# Patient Record
Sex: FEMALE | Race: AMERICAN INDIAN OR ALASKA NATIVE
[De-identification: names, ages, dates, MRNs, and addresses within clinical notes are randomized per-mention and may not be internally consistent; named-entity substitution may affect disease eponyms.]

---

## 2018-09-27 ENCOUNTER — HOSPITAL ENCOUNTER (OUTPATIENT)
Dept: HOSPITAL 5 - SPVIMAG | Age: 73
Discharge: HOME | End: 2018-09-27
Attending: SURGERY
Payer: MEDICARE

## 2018-09-27 DIAGNOSIS — Z90.12: ICD-10-CM

## 2018-09-27 DIAGNOSIS — D24.1: Primary | ICD-10-CM

## 2018-09-27 DIAGNOSIS — Z85.3: ICD-10-CM

## 2018-09-27 PROCEDURE — C8908 MRI W/O FOL W/CONT, BREAST,: HCPCS

## 2018-09-27 PROCEDURE — 77059: CPT

## 2018-09-27 PROCEDURE — A9577 INJ MULTIHANCE: HCPCS

## 2018-09-28 NOTE — MAGNETIC RESONANCE REPORT
BILATERAL BREAST MRI WITHOUT AND WITH CONTRAST: 09/27/18 09:41:00



CLINICAL: Breast cancer survivor status post left mastectomy.  History 

of a right excisional breast biopsy October 2012 with pathology 

revealing ALH.  She had a left modified radical mastectomy in 1992.  No 

history of chemoradiation therapy or adjuvant hormonal therapy.



COMPARISON:03/14/18 right mammogram.



TECHNIQUE: Axial 1.0-mm T1 without,  axial high resolution 2.0-mm T2 

and axial 1.0-mm dynamic Vibrant high-resolution postcontrast T1 fat 

saturation sequences on a 1.5 Te[employer] magnet.  The examination was 

performed with an 8 channel dedicated [employer] breast coil. Post 

processing with CAD and subtraction was performed on an [employer] 

workstation.  14.0 cc of Multihance was injected without incident for 

the contrast portion of the exam. Consent was obtained prior to the 

administration of the contrast.  



FINDINGS:



Right: Minimal background parenchymal enhancement.  No mass or 

suspicious enhancement.  Several right axillary lymph nodes with benign 

morphology.  No suspicious lymph nodes.



Left: Status post left mastectomy with a moderate amount of residual 

soft tissue of the left chest wall.  No mass or suspicious enhancement. 

 No suspicious lymph nodes.



IMPRESSION: Negative study status post left mastectomy.



BI-RADS 1 - - Negative

## 2021-03-24 ENCOUNTER — HOSPITAL ENCOUNTER (OUTPATIENT)
Dept: HOSPITAL 5 - SPVWC | Age: 76
Discharge: HOME | End: 2021-03-24
Attending: SURGERY
Payer: MEDICARE

## 2021-03-24 DIAGNOSIS — Z12.31: Primary | ICD-10-CM

## 2021-03-24 DIAGNOSIS — N64.89: ICD-10-CM

## 2021-03-24 NOTE — MAMMOGRAPHY REPORT
DIGITAL SCREENING MAMMOGRAM WITH CAD, 3/24/2021



CLINICAL INFORMATION / INDICATION: Routine screening mammography. 



TECHNIQUE:  Digital right 2D mammography was obtained in the craniocaudal and mediolateral oblique pr
ojections. This examination was interpreted with the benefit of Computer-Aided Detection analysis.



COMPARISON: Prior mammograms 3/19/2020 and 3/18/2019



FINDINGS: 



Breast Density: There are scattered areas of fibroglandular density.



No dominant mass, suspicious calcifications, or architectural distortion in the right breast. 



There is stable benign postsurgical change in the right breast. There has been no significant change 
compared with the prior examinations.

 

IMPRESSION: No mammographic evidence of malignancy.



Follow up recommendation: Routine yearly



BI-RADS Category 2:  Benign.





-------------------------------------------------------------------------------------------

A "normal" or negative report should not discourage follow up or biopsy of a clinically significant f
inding.



A written summary of these findings will be mailed to the patient. The patient will be entered into a
 mammography reporting system which will generate a reminder letter for the patient's next appointmen
t at the appropriate interval.



The [employer] recommends yearly mammograms starting at age 40 and continuing as l
bess as a woman is in good health.  Breast MRI is recommended for women with an approximate 20-25% or 
greater lifetime risk of breast cancer, including women with a strong family history of breast or ova
freeman cancer or who have been treated for Hodgkin's disease.



Signer Name: Amber Porter MD 

Signed: 3/24/2021 10:32 AM

Workstation Name: [email or web address]